# Patient Record
(demographics unavailable — no encounter records)

---

## 2024-10-30 NOTE — HISTORY OF PRESENT ILLNESS
[FreeTextEntry8] : 33-year-old female presented for concern about bone loss discovered during a dental treatment. She has been having dental issues for around two years and recently had a dental x-ray which revealed bone loss. She also reports occasional fatigue, not related to sleep, needing to rest her eyes for around 30 minutes, and tingling in one of her hands mainly during the night. She denies having any weakness. Mother diagnosed with osteoporosis around her 50s. A sister and her mother have diabetes. Notably, the patient mother's health status began deteriorating in her 50s.

## 2024-11-09 NOTE — HISTORY OF PRESENT ILLNESS
[de-identified] : 33-year old female presents for ongoing management of osteopenia. Patient was initially seen on October 28th with concerns of bone loss discovered during a dental treatment ongoing for two years. She also has occasional fatigue. Blood test results were normal, besides slightly low Vitamin D and HDL. A DEXA scan showed osteopenia with a T-score of -1.1 in the spine, with T-scores of 0.6 and 0.8 in the left femoral neck and left hip respectively. A FRAX 10-year fracture risk assessment showed a risk of any type of fracture at 0.8% and hip fracture at less than 0.1%.